# Patient Record
Sex: MALE | Race: WHITE | NOT HISPANIC OR LATINO | URBAN - NONMETROPOLITAN AREA
[De-identification: names, ages, dates, MRNs, and addresses within clinical notes are randomized per-mention and may not be internally consistent; named-entity substitution may affect disease eponyms.]

---

## 2022-08-03 ENCOUNTER — TRANSCRIBE ORDERS (OUTPATIENT)
Dept: PODIATRY | Facility: CLINIC | Age: 37
End: 2022-08-03

## 2022-08-03 DIAGNOSIS — M79.671 FOOT PAIN, RIGHT: Primary | ICD-10-CM

## 2022-08-22 ENCOUNTER — OFFICE VISIT (OUTPATIENT)
Dept: PODIATRY | Facility: CLINIC | Age: 37
End: 2022-08-22

## 2022-08-22 VITALS — HEIGHT: 71 IN | BODY MASS INDEX: 27.3 KG/M2 | HEART RATE: 72 BPM | WEIGHT: 195 LBS | OXYGEN SATURATION: 98 %

## 2022-08-22 DIAGNOSIS — M79.671 RIGHT FOOT PAIN: Primary | ICD-10-CM

## 2022-08-22 DIAGNOSIS — M72.2 PLANTAR FASCIITIS: ICD-10-CM

## 2022-08-22 PROCEDURE — 20550 NJX 1 TENDON SHEATH/LIGAMENT: CPT | Performed by: PODIATRIST

## 2022-08-22 PROCEDURE — 99203 OFFICE O/P NEW LOW 30 MIN: CPT | Performed by: PODIATRIST

## 2022-08-22 RX ORDER — TRIAMCINOLONE ACETONIDE 40 MG/ML
20 INJECTION, SUSPENSION INTRA-ARTICULAR; INTRAMUSCULAR ONCE
Status: COMPLETED | OUTPATIENT
Start: 2022-08-22 | End: 2022-08-22

## 2022-08-22 RX ORDER — BUSPIRONE HYDROCHLORIDE 10 MG/1
10 TABLET ORAL 3 TIMES DAILY
COMMUNITY

## 2022-08-22 RX ORDER — DEXAMETHASONE SODIUM PHOSPHATE 4 MG/ML
2 INJECTION, SOLUTION INTRA-ARTICULAR; INTRALESIONAL; INTRAMUSCULAR; INTRAVENOUS; SOFT TISSUE ONCE
Status: COMPLETED | OUTPATIENT
Start: 2022-08-22 | End: 2022-08-22

## 2022-08-22 RX ADMIN — TRIAMCINOLONE ACETONIDE 20 MG: 40 INJECTION, SUSPENSION INTRA-ARTICULAR; INTRAMUSCULAR at 14:39

## 2022-08-22 RX ADMIN — DEXAMETHASONE SODIUM PHOSPHATE 2 MG: 4 INJECTION, SOLUTION INTRA-ARTICULAR; INTRALESIONAL; INTRAMUSCULAR; INTRAVENOUS; SOFT TISSUE at 14:38

## 2022-08-22 NOTE — PROGRESS NOTES
"Huber Hernandez  1985  37 y.o. male      08/22/2022    Chief Complaint   Patient presents with   • Right Foot - Pain       History of Present Illness    Huber Hernandez is a 37 y.o.male patient presents to the clinic today for right foot pain. Pain has been present since April. He has had a steroid injection before and says it helped. Xrays obtained today.       Past Medical History:   Diagnosis Date   • Allergic rhinitis          Past Surgical History:   Procedure Laterality Date   • BACK SURGERY      2007, 2016   • TONSILLECTOMY     • TYMPANOSTOMY TUBE PLACEMENT     • VASECTOMY           Family History   Problem Relation Age of Onset   • No Known Problems Mother    • No Known Problems Father        No Known Allergies    Social History     Socioeconomic History   • Marital status:    Tobacco Use   • Smoking status: Never Smoker   • Smokeless tobacco: Never Used   Substance and Sexual Activity   • Alcohol use: Yes     Alcohol/week: 3.0 standard drinks     Types: 3 Cans of beer per week   • Drug use: No         Current Outpatient Medications   Medication Sig Dispense Refill   • busPIRone (BUSPAR) 10 MG tablet Take 10 mg by mouth 3 (Three) Times a Day.       No current facility-administered medications for this visit.       Review of Systems   Constitutional: Negative.    HENT: Negative.    Eyes: Negative.    Respiratory: Negative.    Cardiovascular: Negative.    Gastrointestinal: Negative.    Endocrine: Negative.    Genitourinary: Negative.    Musculoskeletal:        Foot pain   Skin: Negative.    Allergic/Immunologic: Negative.    Neurological: Negative.    Hematological: Negative.    Psychiatric/Behavioral: Negative.          OBJECTIVE    Pulse 72   Ht 180.3 cm (71\")   Wt 88.5 kg (195 lb)   SpO2 98%   BMI 27.20 kg/m²     Physical Exam  Vitals reviewed.   Constitutional:       General: He is not in acute distress.     Appearance: He is well-developed.   HENT:      Head: Normocephalic and atraumatic.    "   Nose: Nose normal.   Eyes:      Conjunctiva/sclera: Conjunctivae normal.      Pupils: Pupils are equal, round, and reactive to light.   Cardiovascular:      Pulses:           Dorsalis pedis pulses are 2+ on the right side.        Posterior tibial pulses are 2+ on the right side.   Pulmonary:      Effort: Pulmonary effort is normal. No respiratory distress.      Breath sounds: No wheezing.   Musculoskeletal:      Right foot: Normal range of motion.        Feet:    Feet:      Right foot:      Skin integrity: Skin integrity normal.   Skin:     General: Skin is warm and dry.      Capillary Refill: Capillary refill takes less than 2 seconds.   Neurological:      Mental Status: He is alert and oriented to person, place, and time.   Psychiatric:         Behavior: Behavior normal.         Thought Content: Thought content normal.                Procedures    Plantar Fasciits Injection  Date/Time: 08/22/2022  Performed by: ROX PAULINO  Authorized by: ROX PAULINO   Consent: Verbal consent obtained. Written consent obtained.  Risks and benefits: risks, benefits and alternatives were discussed  Consent given by: patient  Patient identity confirmed: verbally with patient  Indications: pain relief  Nerve block body site: right heel.  Sedation:  Patient sedated: no    Patient position: sitting  Needle size: 27 G  Local anesthetic: 0.5% Marcaine plain, Kenalog 40 mg/ml , Decadron 4 mg/mL.   Outcome: pain improved  Patient tolerance: Patient tolerated the procedure well with no immediate complications      ASSESSMENT AND PLAN    Diagnoses and all orders for this visit:    1. Right foot pain (Primary)  -     XR Foot 3+ View Right  -     dexamethasone (DECADRON) injection 2 mg  -     triamcinolone acetonide (KENALOG-40) injection 20 mg    2. Plantar fasciitis        - Comprehensive foot and ankle exam performed  - X-rays taken and reviewed.  No acute osseous or articular abnormalities.  - Steroid injection right heel   -  Patient advised to stretch, ice and to make appropriate shoe gear changes to include wearing athletic type shoes with supportive insoles. Patient was given written instructions on how to correctly perform the stretching of the Achilles tendon/calf stretches, and the heel spur/plantar fasciitis regimen. Limit bare foot walking.    - Recommended over-the-counter insole such as power steps, spenco or walk fit  to properly support the arch in order to alleviate the tension and stress on the plantar fascia associated with normal daily walking. Patient was educated on the break-in period for new arch supports.  - All questions were answered to the patient's satisfaction.  - RTC in 6-8 weeks           This document has been electronically signed by Sunil Caceres DPM on August 22, 2022 15:42 CDT     8/22/2022  15:42 CDT

## 2023-02-21 ENCOUNTER — OFFICE VISIT (OUTPATIENT)
Dept: PODIATRY | Facility: CLINIC | Age: 38
End: 2023-02-21
Payer: COMMERCIAL

## 2023-02-21 VITALS
WEIGHT: 195 LBS | HEART RATE: 77 BPM | DIASTOLIC BLOOD PRESSURE: 88 MMHG | HEIGHT: 71 IN | SYSTOLIC BLOOD PRESSURE: 122 MMHG | OXYGEN SATURATION: 97 % | BODY MASS INDEX: 27.3 KG/M2

## 2023-02-21 DIAGNOSIS — M79.671 RIGHT FOOT PAIN: ICD-10-CM

## 2023-02-21 DIAGNOSIS — M72.2 PLANTAR FASCIITIS: Primary | ICD-10-CM

## 2023-02-21 PROCEDURE — 20605 DRAIN/INJ JOINT/BURSA W/O US: CPT | Performed by: NURSE PRACTITIONER

## 2023-02-21 RX ORDER — TRAZODONE HYDROCHLORIDE 100 MG/1
TABLET ORAL
COMMUNITY
Start: 2023-02-02

## 2023-02-21 RX ORDER — DEXAMETHASONE SODIUM PHOSPHATE 4 MG/ML
2 INJECTION, SOLUTION INTRA-ARTICULAR; INTRALESIONAL; INTRAMUSCULAR; INTRAVENOUS; SOFT TISSUE ONCE
Status: COMPLETED | OUTPATIENT
Start: 2023-02-21 | End: 2023-02-22

## 2023-02-21 RX ORDER — TRIAMCINOLONE ACETONIDE 40 MG/ML
10 INJECTION, SUSPENSION INTRA-ARTICULAR; INTRAMUSCULAR ONCE
Status: COMPLETED | OUTPATIENT
Start: 2023-02-21 | End: 2023-02-22

## 2023-02-21 NOTE — PROGRESS NOTES
"Huber Hernandez  1985  38 y.o. male      02/21/2023    Chief Complaint   Patient presents with   • Right Foot - Pain     Plantar Fascitis        History of Present Illness    Huber Hernandez is a 38 y.o.male came to clinic for right foot pain. Patient would like a steroid injection.     38-year-old  male in the office today for an evaluation of his right foot pain.  He had previously been treated here in the office and received an plantar injection which did improve his pain time.  He currently denies any fever, chills or evidence of infection.  He is performing his guided home exercise program using ice and currently denies any new traumatic injury.      Past Medical History:   Diagnosis Date   • Allergic rhinitis          Past Surgical History:   Procedure Laterality Date   • BACK SURGERY      2007, 2016   • TONSILLECTOMY     • TYMPANOSTOMY TUBE PLACEMENT     • VASECTOMY           Family History   Problem Relation Age of Onset   • No Known Problems Mother    • No Known Problems Father        No Known Allergies    Social History     Socioeconomic History   • Marital status:    Tobacco Use   • Smoking status: Never   • Smokeless tobacco: Never   Substance and Sexual Activity   • Alcohol use: Yes     Alcohol/week: 3.0 standard drinks     Types: 3 Cans of beer per week   • Drug use: No         Current Outpatient Medications   Medication Sig Dispense Refill   • traZODone (DESYREL) 100 MG tablet      • busPIRone (BUSPAR) 10 MG tablet Take 10 mg by mouth 3 (Three) Times a Day.       No current facility-administered medications for this visit.       Review of Systems   Musculoskeletal:        Foot pain    All other systems reviewed and are negative.        OBJECTIVE    /88   Pulse 77   Ht 180.3 cm (71\")   Wt 88.5 kg (195 lb)   SpO2 97%   BMI 27.20 kg/m²     Physical Exam  Vitals reviewed.   Constitutional:       Appearance: Normal appearance. He is well-developed.   HENT:      Head: " Normocephalic and atraumatic.   Neck:      Trachea: Trachea and phonation normal.   Cardiovascular:      Pulses:           Dorsalis pedis pulses are 2+ on the right side and 2+ on the left side.        Posterior tibial pulses are 2+ on the right side and 2+ on the left side.   Pulmonary:      Effort: Pulmonary effort is normal. No respiratory distress.   Abdominal:      General: There is no distension.      Palpations: Abdomen is soft.   Musculoskeletal:        Feet:    Feet:      Right foot:      Skin integrity: Skin integrity normal.      Toenail Condition: Right toenails are normal.      Left foot:      Skin integrity: Skin integrity normal.      Toenail Condition: Left toenails are normal.   Skin:     General: Skin is warm and dry.   Neurological:      Mental Status: He is alert and oriented to person, place, and time.      GCS: GCS eye subscore is 4. GCS verbal subscore is 5. GCS motor subscore is 6.   Psychiatric:         Speech: Speech normal.         Behavior: Behavior normal. Behavior is cooperative.         Thought Content: Thought content normal.         Judgment: Judgment normal.                Procedures  Right Plantar Fasciits Injection  Date/Time: 02/21/2023  Performed by: Jama Sanchez  Authorized by: Jama Sanchez   Consent: Verbal consent obtained. Written consent obtained.  Risks and benefits: risks, benefits and alternatives were discussed  Consent given by: patient  Patient identity confirmed: verbally with patient  Indications: pain relief    Sedation:  Patient sedated: no    Patient position: sitting  Needle size: 27 G  Local anesthetic: 0.5% Marcaine plain, Kenalog 40 mg/ml , Decadron 4 mg/mL.   Outcome: pain improved  Patient tolerance: Patient tolerated the procedure well with no immediate complications      Reviewed previous x-rays    ASSESSMENT AND PLAN    Diagnoses and all orders for this visit:    1. Plantar fasciitis (Primary)    2. Right foot pain  -     dexamethasone (DECADRON)  injection 2 mg  -     triamcinolone acetonide (KENALOG-40) injection 10 mg        After risk benefits and treatment options were discussed with the patient detail we proceeded with plantar injection which she tolerated well.  We will continue to guided home exercise program, ice modified activity and contact office in the meantime for any worsening pain.  The patient was instructed to follow-up in 2 weeks for recheck unless symptoms worsen.          This document has been electronically signed by Jama MORRIS, FNP-C, ONP-C on February 22, 2023 09:01 CST

## 2023-02-22 RX ADMIN — TRIAMCINOLONE ACETONIDE 10 MG: 40 INJECTION, SUSPENSION INTRA-ARTICULAR; INTRAMUSCULAR at 08:03

## 2023-02-22 RX ADMIN — DEXAMETHASONE SODIUM PHOSPHATE 2 MG: 4 INJECTION, SOLUTION INTRA-ARTICULAR; INTRALESIONAL; INTRAMUSCULAR; INTRAVENOUS; SOFT TISSUE at 08:03
